# Patient Record
Sex: FEMALE | URBAN - METROPOLITAN AREA
[De-identification: names, ages, dates, MRNs, and addresses within clinical notes are randomized per-mention and may not be internally consistent; named-entity substitution may affect disease eponyms.]

---

## 2022-06-21 ENCOUNTER — NURSE TRIAGE (OUTPATIENT)
Dept: NURSING | Facility: CLINIC | Age: 22
End: 2022-06-21

## 2022-06-22 NOTE — TELEPHONE ENCOUNTER
"Nurse Triage    Is this a 2nd Level Triage? NO    Situation: Patient calling with concerns for bilateral hand pain.  Consent: not needed    Background: Pt states she received to maura tattoes to her hands on 6/17. Pt states that on 6/20 both her hands were itchy. She had applied benadryl topical cream and that was effective. Tonight she states her hands are burning and that the benadryl isn't helping. At this point, patient has not taken any oral pain relievers. She has been applying cold packs.     Assessment: Pt rates her pain at a  9-10/10 stating that it is \"burning pain\" on top of both hands. Pt denies any other symptoms such as neck pain, swelling, rash, hand numbness or fever. Patient is going to take ibuprofen and see if that helps with the pain.     Protocol Recommended Disposition:   See Physician Within 24 Hours    Recommendation: Advised patient to See HCP within 24 hours. Care advice given. Reviewed concerning symptoms and when to call back.     Kanika Kinsey RN [facility] 6/22/2022 12:06 AM      Reason for Disposition    [1] Localized rash is very painful AND [2] no fever    Additional Information    Negative: [1] Similar pain previously AND [2] it was from \"heart attack\"    Negative: [1] Similar pain previously AND [2] it was from \"angina\" AND [3] not relieved by nitroglycerin    Negative: Sounds like a life-threatening emergency to the triager    Negative: Followed a hand or wrist injury    Negative: Chest pain    Negative: Caused by an animal bite    Negative: Caused by a human bite    Negative: Wound looks infected    Negative: Finger pain is main symptom    Negative: Arm pain is main symptom    Negative: [1] Swollen joint AND [2] fever    Negative: [1] Red area or streak AND [2] fever    Negative: Patient sounds very sick or weak to the triager    Negative: [1] SEVERE pain (e.g., excruciating, unable to use hand at all) AND [2] not improved after 2 hours of pain medicine    Negative: " [1] Looks infected (spreading redness, pus) AND [2] large red area (> 2 in. or 5 cm)    Negative: Weakness (i.e., loss of strength) of new onset in hand or fingers  (Exceptions: not truly weak, hand feels weak because of pain; weakness present > 2 weeks)    Negative: Numbness (i.e., loss of sensation) in hand or fingers (Exception: just tingling; numbness present > 2 weeks)    Negative: Looks like a boil, infected sore, deep ulcer or other infected rash (spreading redness, pus)    Protocols used: HAND AND WRIST PAIN-A-AH